# Patient Record
Sex: MALE | Race: WHITE | HISPANIC OR LATINO | Employment: UNEMPLOYED | URBAN - METROPOLITAN AREA
[De-identification: names, ages, dates, MRNs, and addresses within clinical notes are randomized per-mention and may not be internally consistent; named-entity substitution may affect disease eponyms.]

---

## 2023-09-15 ENCOUNTER — OFFICE VISIT (OUTPATIENT)
Dept: FAMILY MEDICINE CLINIC | Facility: CLINIC | Age: 58
End: 2023-09-15

## 2023-09-15 ENCOUNTER — NURSE TRIAGE (OUTPATIENT)
Age: 58
End: 2023-09-15

## 2023-09-15 VITALS
BODY MASS INDEX: 31.98 KG/M2 | RESPIRATION RATE: 16 BRPM | DIASTOLIC BLOOD PRESSURE: 80 MMHG | TEMPERATURE: 98.6 F | HEART RATE: 59 BPM | OXYGEN SATURATION: 99 % | WEIGHT: 211 LBS | SYSTOLIC BLOOD PRESSURE: 130 MMHG | HEIGHT: 68 IN

## 2023-09-15 DIAGNOSIS — C61 PROSTATE CANCER (HCC): ICD-10-CM

## 2023-09-15 DIAGNOSIS — C61 PROSTATE CANCER (HCC): Primary | ICD-10-CM

## 2023-09-15 DIAGNOSIS — I10 PRIMARY HYPERTENSION: Primary | ICD-10-CM

## 2023-09-15 DIAGNOSIS — E78.5 HYPERLIPIDEMIA, UNSPECIFIED HYPERLIPIDEMIA TYPE: ICD-10-CM

## 2023-09-15 PROCEDURE — 99204 OFFICE O/P NEW MOD 45 MIN: CPT | Performed by: FAMILY MEDICINE

## 2023-09-15 RX ORDER — AMLODIPINE BESYLATE 5 MG/1
5 TABLET ORAL DAILY
Qty: 90 TABLET | Refills: 1 | Status: SHIPPED | OUTPATIENT
Start: 2023-09-15

## 2023-09-15 RX ORDER — HYDROCHLOROTHIAZIDE 25 MG/1
TABLET ORAL
COMMUNITY
Start: 2023-08-28

## 2023-09-15 RX ORDER — ATORVASTATIN CALCIUM 10 MG/1
TABLET, FILM COATED ORAL
COMMUNITY
Start: 2023-06-23 | End: 2023-09-15 | Stop reason: SDUPTHER

## 2023-09-15 RX ORDER — ATORVASTATIN CALCIUM 10 MG/1
10 TABLET, FILM COATED ORAL DAILY
Qty: 90 TABLET | Refills: 1 | Status: SHIPPED | OUTPATIENT
Start: 2023-09-15

## 2023-09-15 RX ORDER — AMLODIPINE BESYLATE 5 MG/1
TABLET ORAL
COMMUNITY
Start: 2023-06-23 | End: 2023-09-15 | Stop reason: SDUPTHER

## 2023-09-15 NOTE — TELEPHONE ENCOUNTER
Looks OK to me. This patient is on active surveillance for mitchell 6 prostate cancer since 2019. seen in [place] and then [facility]. Last seen March. On protocol. This is second/third opinion. He is due for PSA prior to visit this fall.

## 2023-09-15 NOTE — TELEPHONE ENCOUNTER
Please assist in scheduling an earlier appointment for elevated psa of 5.32 history of prostate cancer. [place]ian provider preferred. Regarding: Prostate cancer  ----- Message from [place], [place] sent at 9/15/2023 11:22 AM EDT -----  New Patient    What is the reason for the patient's appointment?:  Errol Yin from [address] called to schedule prostate cancer. Pt was diagnosed at [facility] will need .    Pt can be reached at [phone]    What office location does the patient prefer?: WE    Does patient have Imaging/Lab Results:    Have patient records been requested?:  If No, are the records showing in Epic:       INSURANCE:   Do we accept the patient's insurance or is the patient Self-Pay?:    Insurance Provider: [employer] Type/Number:   Member ID#:       HISTORY:   Has the patient had any previous Urologist(s)?: [facility]     Was the patient seen in the ED?: no     Has the patient had any outside testing done?:[facility]     Does the patient have a personal history of cancer?: Prostate cancer

## 2023-09-15 NOTE — TELEPHONE ENCOUNTER
Spoke with patients spouse Marcin Avila and confirmed patients upcoming appointment. Aware patient will require updated PSA level prior to appointment. She requested script be e-mailed to [email or web address] as patient uses [employer]. Script sent.

## 2023-09-15 NOTE — PROGRESS NOTES
Name: Azul Nicole      : 1965      MRN: [id number]  Encounter Provider: Tracey Jensen MD  Encounter Date: 9/15/2023   Encounter department: [address] [facility]     1. Primary hypertension  Assessment & Plan:  BP today is 130/80 mmHg  Stable  Continue amlodipine 5 mg and hydrochlorothiazide 25 mg daily  Low-salt diet, regular exercise, weight loss    Orders:  -     amLODIPine (NORVASC) 5 mg tablet; Take 1 tablet (5 mg total) by mouth daily    2. Hyperlipidemia, unspecified hyperlipidemia type  Assessment & Plan:  Continue atorvastatin 10 mg daily  Labs reviewed  Low Fat Diet, regular Exercise      Orders:  -     atorvastatin (LIPITOR) 10 mg tablet; Take 1 tablet (10 mg total) by mouth daily    3. Prostate cancer [facility])  Assessment & Plan: We will refer to urology for further management    Orders:  -     Ambulatory Referral to Urology; Future           Subjective     HPI   Azul Nicole is a 59 y.o. male  who presented to the office today to establish care. He has a past medical history significant for prostate cancer, hypertension, hyperlipidemia and obesity. Today reports he is feeling well and has no concerns other than reestablishing care with a specialist for continued care especially for the prostate cancer. He has a strong family history of prostate cancer. The following portions of the patient's history were reviewed and updated as appropriate: allergies, current medications, past family history, past medical history, past social history, past surgical history and problem list.    Review of Systems   Constitutional: Negative for chills and fever. HENT: Negative for congestion, rhinorrhea and sore throat. Respiratory: Negative for cough and shortness of breath. Cardiovascular: Negative for chest pain. Gastrointestinal: Negative for diarrhea, nausea and vomiting.    Genitourinary: Negative for difficulty urinating and frequency. Skin: Negative for rash. Neurological: Negative for dizziness and headaches. Past Medical History:   Diagnosis Date   • Hyperlipidemia    • Hypertension    • Prostate cancer [facility])      History reviewed. No pertinent surgical history. Family History   Problem Relation Age of Onset   • Diabetes Mother    • Hypertension Mother    • Hypertension Father    • Prostate cancer Father    • Prostate cancer Brother    • Diabetes Paternal Grandfather    • Prostate cancer Paternal Uncle      Social History     Socioeconomic History   • Marital status: /Civil Union     Spouse name: None   • Number of children: 2   • Years of education: None   • Highest education level: 11th grade   Occupational History   • None   Tobacco Use   • Smoking status: Never     Passive exposure: Never   • Smokeless tobacco: Never   Vaping Use   • Vaping Use: Never used   Substance and Sexual Activity   • Alcohol use: Yes     Alcohol/week: 2.0 standard drinks of alcohol     Types: 2 Glasses of wine per week     Comment: socially   • Drug use: Not Currently   • Sexual activity: Yes     Partners: Female   Other Topics Concern   • None   Social History Narrative    Lives wife, mother-in-law, daughter, and granddaughter    No pets    Occupation: construction     Social Determinants of Health     Financial Resource Strain: [address]  (9/15/2023)    Overall Financial Resource Strain (CARDIA)    • Difficulty of Paying Living Expenses: Not hard at all   Food Insecurity: No Food Insecurity (9/15/2023)    Hunger Vital Sign    • Worried About Running Out of Food in the Last Year: Never true    • Ran Out of Food in the Last Year: Never true   Transportation Needs: No Transportation Needs (9/15/2023)    PRAPARE - Transportation    • Lack of Transportation (Medical): No    • Lack of Transportation (Non-Medical):  No   Physical Activity: Not on file   Stress: Not on file   Social Connections: Not on file   Intimate Partner Violence: Not on file   Housing Stability: Not on file     Current Outpatient Medications on File Prior to Visit   Medication Sig   • hydrochlorothiazide (HYDRODIURIL) 25 mg tablet      No Known Allergies  Immunization History   Administered Date(s) Administered   • COVID-19 PFIZER VACCINE 0.3 ML IM 06/10/2021, 07/01/2021   • COVID-19 Pfizer vac (King-sucrose, gray cap) 12 yr+ IM 03/31/2022       Objective     /80 (BP Location: Right arm, Patient Position: Sitting, Cuff Size: Large)   Pulse 59   Temp 98.6 °F (37 °C) (Temporal)   Resp 16   Ht 5' 8" (1.727 m)   Wt 95.7 kg (211 lb)   SpO2 99%   BMI 32.08 kg/m²     Physical Exam  Vitals and nursing note reviewed. Constitutional:       Appearance: He is well-developed. HENT:      Head: Normocephalic and atraumatic. Right Ear: External ear normal.      Left Ear: External ear normal.   Eyes:      Extraocular Movements: Extraocular movements intact. Conjunctiva/sclera: Conjunctivae normal.   Cardiovascular:      Rate and Rhythm: Normal rate and regular rhythm. Heart sounds: Normal heart sounds. No murmur heard. Pulmonary:      Effort: Pulmonary effort is normal. No respiratory distress. Abdominal:      General: There is no distension. Palpations: Abdomen is soft. Tenderness: There is no abdominal tenderness. Musculoskeletal:      Right lower leg: No edema. Left lower leg: No edema. Neurological:      General: No focal deficit present. Mental Status: He is alert and oriented to person, place, and time.    Psychiatric:         Mood and Affect: Mood normal.         Behavior: Behavior normal.       Lawyer Ga MD

## 2023-10-03 PROBLEM — E78.5 HYPERLIPIDEMIA: Status: ACTIVE | Noted: 2023-10-03

## 2023-10-03 PROBLEM — I10 PRIMARY HYPERTENSION: Status: ACTIVE | Noted: 2023-10-03

## 2023-10-03 PROBLEM — C61 PROSTATE CANCER (HCC): Status: ACTIVE | Noted: 2023-10-03

## 2023-10-03 NOTE — ASSESSMENT & PLAN NOTE
BP today is 130/80 mmHg  Stable  Continue amlodipine 5 mg and hydrochlorothiazide 25 mg daily  Low-salt diet, regular exercise, weight loss

## 2023-10-30 ENCOUNTER — TELEPHONE (OUTPATIENT)
Dept: FAMILY MEDICINE CLINIC | Facility: CLINIC | Age: 58
End: 2023-10-30

## 2023-11-10 ENCOUNTER — OFFICE VISIT (OUTPATIENT)
Dept: UROLOGY | Facility: AMBULATORY SURGERY CENTER | Age: 58
End: 2023-11-10
Payer: COMMERCIAL

## 2023-11-10 VITALS
DIASTOLIC BLOOD PRESSURE: 86 MMHG | WEIGHT: 205 LBS | OXYGEN SATURATION: 95 % | BODY MASS INDEX: 31.17 KG/M2 | HEART RATE: 68 BPM | SYSTOLIC BLOOD PRESSURE: 126 MMHG

## 2023-11-10 DIAGNOSIS — C61 PROSTATE CANCER (HCC): ICD-10-CM

## 2023-11-10 PROCEDURE — 99204 OFFICE O/P NEW MOD 45 MIN: CPT | Performed by: UROLOGY

## 2023-11-10 NOTE — PROGRESS NOTES
11/10/2023    Juan Mcdonald  1965  [id number]        Assessment  Small volume Ramesh 6 prostate cancer on active surveillance      Discussion  I had a lengthy discussion with Juan and his wife in the office today regarding [place] 6 prostate cancer. I am now the third urologist he has seen including his urologist in [place] as well as [facility].  I recommend that we repeat a PSA as well as a basic metabolic panel along with a multiparametric MRI of the prostate prior to making a decision about a possible fourth prostate biopsy. Patient will return after the above been completed. We discussed that approximately one third of men on active surveillance will eventually have disease progression and need some type of definitive treatment, however, two thirds of men will continue safely on active surveillance. History of Present Illness  59 y.o. male with a history of an elevated PSA of 5.23 from February 2023. The patient was diagnosed with prostate cancer in 2019 in [place]. In care everywhere there is report of a 12 core biopsy showing Ramesh 3+3 disease. The patient was placed on to active surveillance. Luqit genomic testing was performed on that biopsy. 10-year disease specific mortality with active surveillance was noted to be 1.6%. 10-year risk of metastatic disease with primary treatment was noted to be 0.4%. He had a multiparametric MRI of the prostate performed at [facility] in March 2020 that showed only PI-RADS 1 scoring. He denies any lower urinary tract symptoms or hematuria. He is switching from [facility] to [facility] [place] secondary to an insurance change. AUA Symptom Score      Review of Systems  Review of Systems   Constitutional: Negative. HENT: Negative. Eyes: Negative. Respiratory: Negative. Cardiovascular: Negative. Gastrointestinal: Negative. Endocrine: Negative.     Genitourinary:         Per HPI Musculoskeletal: Negative. Skin: Negative. Allergic/Immunologic: Negative. Neurological: Negative. Hematological: Negative. Psychiatric/Behavioral: Negative. Past Medical History  Past Medical History:   Diagnosis Date    Hyperlipidemia     Hypertension     Prostate cancer [facility])        Past Social History  History reviewed. No pertinent surgical history. Past Family History  Family History   Problem Relation Age of Onset    Diabetes Mother     Hypertension Mother     Hypertension Father     Prostate cancer Father     Prostate cancer Brother     Diabetes Paternal Grandfather     Prostate cancer Paternal Uncle        Past Social history  Social History     Socioeconomic History    Marital status: /Civil Union     Spouse name: Not on file    Number of children: 2    Years of education: Not on file    Highest education level: 11th grade   Occupational History    Not on file   Tobacco Use    Smoking status: Never     Passive exposure: Never    Smokeless tobacco: Never   Vaping Use    Vaping Use: Never used   Substance and Sexual Activity    Alcohol use:  Yes     Alcohol/week: 2.0 standard drinks of alcohol     Types: 2 Glasses of wine per week     Comment: socially    Drug use: Not Currently    Sexual activity: Yes     Partners: Female   Other Topics Concern    Not on file   Social History Narrative    Lives wife, mother-in-law, daughter, and granddaughter    No pets    Occupation: construction     Social Determinants of Health     Financial Resource Strain: [address]  (9/15/2023)    Overall Financial Resource Strain (CARDIA)     Difficulty of Paying Living Expenses: Not hard at all   Food Insecurity: No Food Insecurity (9/15/2023)    Hunger Vital Sign     Worried About Running Out of Food in the Last Year: Never true     [address] in the Last Year: Never true   Transportation Needs: No Transportation Needs (9/15/2023)    PRAPARE - Transportation     Lack of Transportation (Medical): No     Lack of Transportation (Non-Medical): No   Physical Activity: Not on file   Stress: Not on file   Social Connections: Not on file   Intimate Partner Violence: Not on file   Housing Stability: Not on file       Current Medications  Current Outpatient Medications   Medication Sig Dispense Refill    amLODIPine (NORVASC) 5 mg tablet Take 1 tablet (5 mg total) by mouth daily 90 tablet 1    atorvastatin (LIPITOR) 10 mg tablet Take 1 tablet (10 mg total) by mouth daily 90 tablet 1    hydrochlorothiazide (HYDRODIURIL) 25 mg tablet        No current facility-administered medications for this visit. Allergies  No Known Allergies    Past Medical History, Social History, Family History, medications and allergies were reviewed. Vitals  Vitals:    11/10/23 1258   BP: 126/86   BP Location: Left arm   Patient Position: Sitting   Cuff Size: Large   Pulse: 68   SpO2: 95%   Weight: 93 kg (205 lb)       Physical Exam  Physical Exam  On examination he is in no acute distress. His abdomen is soft nontender nondistended.  examination feels no inguinal hernias. Phallus is normal and uncircumcised. Scrotum scrotal contents are normal.  Digital rectal examination reveals a 945 to 50 g prostate without nodularity. Skin is warm. Extremities without edema.   Neurologic is grossly intact and nonfocal.  Gait normal.  Affect normal      Results  No results found for: "PSA"  No results found for: "GLUCOSE", "CALCIUM", "NA", "K", "CO2", "CL", "BUN", "CREATININE"  No results found for: "WBC", "HGB", "HCT", "MCV", "PLT"      Office Urine Dip  No results found for this or any previous visit (from the past 1 hour(s)).]

## 2023-11-27 LAB
BUN SERPL-MCNC: 19 MG/DL (ref 7–25)
BUN/CREAT SERPL: NORMAL (CALC) (ref 6–22)
CALCIUM SERPL-MCNC: 9.7 MG/DL (ref 8.6–10.3)
CHLORIDE SERPL-SCNC: 104 MMOL/L (ref 98–110)
CO2 SERPL-SCNC: 29 MMOL/L (ref 20–32)
CREAT SERPL-MCNC: 1.08 MG/DL (ref 0.7–1.35)
GFR/BSA.PRED SERPLBLD CYS-BASED-ARV: 79 ML/MIN/1.73M2
GLUCOSE SERPL-MCNC: 99 MG/DL (ref 65–99)
POTASSIUM SERPL-SCNC: 3.9 MMOL/L (ref 3.5–5.3)
PSA SERPL-MCNC: 7.18 NG/ML
SODIUM SERPL-SCNC: 142 MMOL/L (ref 135–146)

## 2023-12-04 ENCOUNTER — HOSPITAL ENCOUNTER (OUTPATIENT)
Facility: MEDICAL CENTER | Age: 58
Discharge: HOME | End: 2023-12-04
Payer: COMMERCIAL

## 2023-12-04 DIAGNOSIS — C61 PROSTATE CANCER (HCC): ICD-10-CM

## 2023-12-04 PROCEDURE — 72197 MRI PELVIS W/O & W/DYE: CPT

## 2023-12-04 PROCEDURE — A9585 GADOBUTROL INJECTION: HCPCS | Performed by: UROLOGY

## 2023-12-04 PROCEDURE — G1004 CDSM NDSC: HCPCS

## 2023-12-04 PROCEDURE — 76377 3D RENDER W/INTRP POSTPROCES: CPT

## 2023-12-04 RX ORDER — GADOBUTROL 604.72 MG/ML
9 INJECTION INTRAVENOUS
Status: COMPLETED | OUTPATIENT
Start: 2023-12-04 | End: 2023-12-04

## 2023-12-04 RX ADMIN — GADOBUTROL 9 ML: 604.72 INJECTION INTRAVENOUS at 16:14

## 2023-12-11 ENCOUNTER — TELEPHONE (OUTPATIENT)
Dept: UROLOGY | Facility: AMBULATORY SURGERY CENTER | Age: 58
End: 2023-12-11

## 2023-12-11 DIAGNOSIS — C61 PROSTATE CANCER (HCC): Primary | ICD-10-CM

## 2023-12-11 NOTE — TELEPHONE ENCOUNTER
can reschedule to a day FT can see him for ongoing active surveillance prostate cancer third opinion

## 2023-12-12 NOTE — TELEPHONE ENCOUNTER
Called and spoke with patient. Informed on appt change. He is aware of appt location. Appt reminder card mailed.

## 2024-02-20 LAB — PSA SERPL-MCNC: 6.2 NG/ML

## 2024-02-27 ENCOUNTER — OFFICE VISIT (OUTPATIENT)
Dept: UROLOGY | Facility: CLINIC | Age: 59
End: 2024-02-27
Payer: COMMERCIAL

## 2024-02-27 VITALS
HEART RATE: 60 BPM | OXYGEN SATURATION: 97 % | WEIGHT: 209 LBS | DIASTOLIC BLOOD PRESSURE: 90 MMHG | SYSTOLIC BLOOD PRESSURE: 122 MMHG | HEIGHT: 68 IN | BODY MASS INDEX: 31.67 KG/M2

## 2024-02-27 DIAGNOSIS — C61 PROSTATE CANCER (HCC): Primary | ICD-10-CM

## 2024-02-27 PROCEDURE — 99214 OFFICE O/P EST MOD 30 MIN: CPT | Performed by: UROLOGY

## 2024-02-27 NOTE — PROGRESS NOTES
2/27/2024    Juan Galvez  1965  [id number]        Assessment  Ramesh 6 prostate cancer on active surveillance      Discussion  We discussed his most recent multiparametric MRI of the prostate with PI-RADS 2 scoring as well as a stable PSA of 6.  He wishes to continue with active surveillance which I feel is very reasonable.  We did, however, discussed that his last prostate biopsy was performed in 2021 and that we should highly consider repeating a biopsy in calendar year 2024.  He wishes to wait until he follows up in 6 months time with his next PSA level.  We will then make a determination to perform a surveillance biopsy.  We discussed an office-based biopsy versus a transperineal biopsy in the operating room with sedation and he would favor the latter.  His wife was present in the office today for discussion.        History of Present Illness  58 y.o. male with a history of Ramesh 6 prostate cancer on active surveillance.  He was diagnosed originally in 2019 in [facility].  He has small volume Ramesh 6 disease.  He had a confirmatory biopsy in 2021 at [facility].  His PSA has ranged between 5 and 7 and most recently is 6.  His last biopsy in 2021 showed small volume Coello 6 disease with a very favorable Prolaris profile with less than 1% 10-year disease specific mortality with observation.  He is opted to continue with active surveillance.  He returns in routine follow-up today.  As part of active surveillance he had a multiparametric MRI of the prostate performed in December 2023 which shows PI-RADS 2 scoring.          AUA Symptom Score      Review of Systems  Review of Systems   Constitutional: Negative.    HENT: Negative.     Eyes: Negative.    Respiratory: Negative.     Cardiovascular: Negative.    Gastrointestinal: Negative.    Endocrine: Negative.    Genitourinary:         Per HPI   Musculoskeletal: Negative.    Skin: Negative.    Allergic/Immunologic: Negative.     Neurological: Negative.    Hematological: Negative.    Psychiatric/Behavioral: Negative.           Past Medical History  Past Medical History:   Diagnosis Date    Hyperlipidemia     Hypertension     Prostate cancer (HCC)        Past Social History  History reviewed. No pertinent surgical history.    Past Family History  Family History   Problem Relation Age of Onset    Diabetes Mother     Hypertension Mother     Hypertension Father     Prostate cancer Father     Prostate cancer Brother     Diabetes Paternal Grandfather     Prostate cancer Paternal Uncle        Past Social history  Social History     Socioeconomic History    Marital status: /Civil Union     Spouse name: Not on file    Number of children: 2    Years of education: Not on file    Highest education level: 11th grade   Occupational History    Not on file   Tobacco Use    Smoking status: Never     Passive exposure: Never    Smokeless tobacco: Never   Vaping Use    Vaping status: Never Used   Substance and Sexual Activity    Alcohol use: Yes     Alcohol/week: 2.0 standard drinks of alcohol     Types: 2 Glasses of wine per week     Comment: socially    Drug use: Not Currently    Sexual activity: Yes     Partners: Female   Other Topics Concern    Not on file   Social History Narrative    Lives wife, mother-in-law, daughter, and granddaughter    No pets    Occupation: construction     Social Determinants of Health     Financial Resource Strain: Low Risk  (9/15/2023)    Overall Financial Resource Strain (CARDIA)     Difficulty of Paying Living Expenses: Not hard at all   Food Insecurity: No Food Insecurity (9/15/2023)    Hunger Vital Sign     Worried About Running Out of Food in the Last Year: Never true     Ran Out of Food in the Last Year: Never true   Transportation Needs: No Transportation Needs (9/15/2023)    PRAPARE - Transportation     Lack of Transportation (Medical): No     Lack of Transportation (Non-Medical): No   Physical Activity:  Sufficiently Active (2/27/2023)    Received from [facility]    Exercise Vital Sign     Days of Exercise per Week: 4 days     Minutes of Exercise per Session: 90 min   Stress: No Stress Concern Present (2/27/2023)    Received from [facility]    [employer] - Occupational Stress Questionnaire     Feeling of Stress : Only a little   Social Connections: Socially Isolated (2/27/2023)    Received from [facility]    Social Connection and Isolation Panel [NHANES]     Frequency of Communication with Friends and Family: Once a week     Frequency of Social Gatherings with Friends and Family: Once a week     Attends Jehovah's witness Services: Never     Active Member of Clubs or Organizations: No     Attends Club or Organization Meetings: Never     Marital Status:    Intimate Partner Violence: Not At Risk (2/27/2023)    Received from [facility]    Humiliation, Afraid, Rape, and Kick questionnaire     Fear of Current or Ex-Partner: No     Emotionally Abused: No     Physically Abused: No     Sexually Abused: No   Housing Stability: Low Risk  (2/27/2023)    Received from [facility]    Housing Stability Vital Sign     Unable to Pay for Housing in the Last Year: No     Number of Places Lived in the Last Year: 1     Unstable Housing in the Last Year: No       Current Medications  Current Outpatient Medications   Medication Sig Dispense Refill    amLODIPine (NORVASC) 5 mg tablet Take 1 tablet (5 mg total) by mouth daily 90 tablet 1    atorvastatin (LIPITOR) 10 mg tablet Take 1 tablet (10 mg total) by mouth daily 90 tablet 1    hydrochlorothiazide (HYDRODIURIL) 25 mg tablet        No current facility-administered medications for this visit.       Allergies  No Known Allergies    Past Medical History, Social History, Family History, medications and allergies were reviewed.    Vitals  Vitals:    02/27/24 1129   BP: 122/90  "  BP Location: Left arm   Patient Position: Sitting   Cuff Size: Large   Pulse: 60   SpO2: 97%   Weight: 94.8 kg (209 lb)   Height: 5' 8\" (1.727 m)       Physical Exam  Physical Exam  On examination he is in no acute distress.  Gait normal.  Affect normal      Results  Lab Results   Component Value Date    PSA 6.20 (H) 02/20/2024    PSA 7.18 (H) 11/27/2023     Lab Results   Component Value Date    CALCIUM 9.7 11/27/2023    K 3.9 11/27/2023    CO2 29 11/27/2023     11/27/2023    BUN 19 11/27/2023    CREATININE 1.08 11/27/2023     No results found for: \"WBC\", \"HGB\", \"HCT\", \"MCV\", \"PLT\"      Office Urine Dip  No results found for this or any previous visit (from the past 1 hour(s)).]          "

## 2024-02-28 ENCOUNTER — OFFICE VISIT (OUTPATIENT)
Dept: FAMILY MEDICINE CLINIC | Facility: CLINIC | Age: 59
End: 2024-02-28

## 2024-02-28 VITALS
OXYGEN SATURATION: 97 % | HEIGHT: 68 IN | TEMPERATURE: 97.2 F | RESPIRATION RATE: 16 BRPM | WEIGHT: 212 LBS | HEART RATE: 75 BPM | SYSTOLIC BLOOD PRESSURE: 130 MMHG | BODY MASS INDEX: 32.13 KG/M2 | DIASTOLIC BLOOD PRESSURE: 90 MMHG

## 2024-02-28 DIAGNOSIS — E78.5 HYPERLIPIDEMIA, UNSPECIFIED HYPERLIPIDEMIA TYPE: ICD-10-CM

## 2024-02-28 DIAGNOSIS — Z23 ENCOUNTER FOR IMMUNIZATION: ICD-10-CM

## 2024-02-28 DIAGNOSIS — L30.9 DERMATITIS: ICD-10-CM

## 2024-02-28 DIAGNOSIS — Z11.59 NEED FOR HEPATITIS C SCREENING TEST: ICD-10-CM

## 2024-02-28 DIAGNOSIS — I10 PRIMARY HYPERTENSION: ICD-10-CM

## 2024-02-28 DIAGNOSIS — Z11.4 ENCOUNTER FOR SCREENING FOR HIV: ICD-10-CM

## 2024-02-28 DIAGNOSIS — Z00.00 ANNUAL PHYSICAL EXAM: Primary | ICD-10-CM

## 2024-02-28 DIAGNOSIS — J30.9 ALLERGIC RHINITIS, UNSPECIFIED SEASONALITY, UNSPECIFIED TRIGGER: ICD-10-CM

## 2024-02-28 PROCEDURE — 90471 IMMUNIZATION ADMIN: CPT

## 2024-02-28 PROCEDURE — 99396 PREV VISIT EST AGE 40-64: CPT | Performed by: FAMILY MEDICINE

## 2024-02-28 PROCEDURE — 90715 TDAP VACCINE 7 YRS/> IM: CPT

## 2024-02-28 RX ORDER — AMLODIPINE BESYLATE 5 MG/1
5 TABLET ORAL DAILY
Qty: 90 TABLET | Refills: 1 | Status: SHIPPED | OUTPATIENT
Start: 2024-02-28

## 2024-02-28 RX ORDER — FLUTICASONE PROPIONATE 50 MCG
1 SPRAY, SUSPENSION (ML) NASAL DAILY
Qty: 16 G | Refills: 2 | Status: SHIPPED | OUTPATIENT
Start: 2024-02-28

## 2024-02-28 RX ORDER — HYDROCHLOROTHIAZIDE 25 MG/1
25 TABLET ORAL DAILY
Qty: 90 TABLET | Refills: 1 | Status: SHIPPED | OUTPATIENT
Start: 2024-02-28

## 2024-02-28 RX ORDER — ATORVASTATIN CALCIUM 10 MG/1
10 TABLET, FILM COATED ORAL DAILY
Qty: 90 TABLET | Refills: 1 | Status: SHIPPED | OUTPATIENT
Start: 2024-02-28

## 2024-02-28 NOTE — PROGRESS NOTES
ADULT ANNUAL PHYSICAL  [facility]    NAME: Juan Galvez  AGE: 58 y.o. SEX: male  : 1965     DATE: 3/5/2024     Assessment and Plan:     Problem List Items Addressed This Visit        Cardiovascular and Mediastinum    Primary hypertension    Relevant Medications    hydroCHLOROthiazide 25 mg tablet    amLODIPine (NORVASC) 5 mg tablet    Other Relevant Orders    Lipid panel    CBC and Platelet    Comprehensive metabolic panel    Hemoglobin A1C       Other    Hyperlipidemia    Relevant Medications    atorvastatin (LIPITOR) 10 mg tablet    Other Relevant Orders    Lipid panel    CBC and Platelet    Comprehensive metabolic panel    Hemoglobin A1C   Other Visit Diagnoses     Annual physical exam    -  Primary    Allergic rhinitis, unspecified seasonality, unspecified trigger        Relevant Medications    fluticasone (FLONASE) 50 mcg/act nasal spray    Encounter for immunization        Relevant Orders    TDAP VACCINE GREATER THAN OR EQUAL TO 8YO IM (Completed)    Encounter for screening for HIV        Relevant Orders    HIV 1/2 AG/AB w Reflex [facility] for 2 yr old and above    Need for hepatitis C screening test        Relevant Orders    Hepatitis C antibody    BMI 32.0-32.9,adult        Dermatitis        Relevant Medications    hydrocortisone 2.5 % cream          Immunizations and preventive care screenings were discussed with patient today. Appropriate education was printed on patient's after visit summary.    Discussed risks and benefits of prostate cancer screening. We discussed the controversial history of PSA screening for prostate cancer in the [place] as well as the risk of over detection and over treatment of prostate cancer by way of PSA screening.  The patient understands that PSA blood testing is an imperfect way to screen for prostate cancer and that elevated PSA levels in the blood may also be caused by infection,  inflammation, prostatic trauma or manipulation, urological procedures, or by benign prostatic enlargement.    The role of the digital rectal examination in prostate cancer screening was also discussed and I discussed with him that there is large interobserver variability in the findings of digital rectal examination.    Counseling:  Alcohol/drug use: discussed moderation in alcohol intake, the recommendations for healthy alcohol use, and avoidance of illicit drug use.  Dental Health: discussed importance of regular tooth brushing, flossing, and dental visits.  Injury prevention: discussed safety/seat belts, safety helmets, smoke detectors, carbon dioxide detectors, and smoking near bedding or upholstery.  Exercise: the importance of regular exercise/physical activity was discussed. Recommend exercise 3-5 times per week for at least 30 minutes.          Return in about 6 months (around 8/28/2024) for Recheck Hypertension, HLD.     Chief Complaint:     Chief Complaint   Patient presents with   • Physical Exam      History of Present Illness:     Adult Annual Physical   Patient here for a comprehensive physical exam. The patient reports problems - left akle pain when the weather is cold. .    Diet and Physical Activity  Diet/Nutrition: well balanced diet, heart healthy (low sodium) diet, and consuming 3-5 servings of fruits/vegetables daily.   Exercise: no formal exercise. States he is active at work     Depression Screening  PHQ-2/9 Depression Screening         General Health  Sleep: sleeps well and gets 4-6 hours of sleep on average.   Hearing: normal - bilateral.  Vision: most recent eye exam <1 year ago and wears glasses.   Dental: regular dental visits and brushes teeth twice daily.   He needs a root canal     Health  Symptoms include: nocturia    Advanced Care Planning  Do you have an advanced directive? no  Do you have a durable medical power of ? no  ACP document given to patient? no     Review of  Systems:     Review of Systems   Constitutional:  Negative for chills and fever.   HENT:  Negative for congestion, rhinorrhea and sore throat.    Respiratory:  Negative for cough and shortness of breath.    Cardiovascular:  Negative for chest pain.   Gastrointestinal:  Negative for diarrhea, nausea and vomiting.   Genitourinary:  Negative for difficulty urinating and frequency.   Musculoskeletal:         + chronic left shoulder pain   Skin:  Negative for rash.   Neurological:  Negative for dizziness and headaches.      Past Medical History:     Past Medical History:   Diagnosis Date   • Hyperlipidemia    • Hypertension    • Prostate cancer (HCC)       Past Surgical History:     No past surgical history on file.   Family History:     Family History   Problem Relation Age of Onset   • Diabetes Mother    • Hypertension Mother    • Hypertension Father    • Prostate cancer Father    • Prostate cancer Brother    • Diabetes Paternal Grandfather    • Prostate cancer Paternal Uncle       Social History:     Social History     Socioeconomic History   • Marital status: /Civil Union     Spouse name: None   • Number of children: 2   • Years of education: None   • Highest education level: 11th grade   Occupational History   • None   Tobacco Use   • Smoking status: Never     Passive exposure: Never   • Smokeless tobacco: Never   Vaping Use   • Vaping status: Never Used   Substance and Sexual Activity   • Alcohol use: Yes     Alcohol/week: 2.0 standard drinks of alcohol     Types: 2 Glasses of wine per week     Comment: socially   • Drug use: Not Currently   • Sexual activity: Yes     Partners: Female   Other Topics Concern   • None   Social History Narrative    Lives wife, mother-in-law, daughter, and granddaughter    No pets    Occupation: construction     Social Determinants of Health     Financial Resource Strain: Low Risk  (9/15/2023)    Overall Financial Resource Strain (CARDIA)    • Difficulty of Paying Living  Expenses: Not hard at all   Food Insecurity: No Food Insecurity (9/15/2023)    Hunger Vital Sign    • Worried About Running Out of Food in the Last Year: Never true    • Ran Out of Food in the Last Year: Never true   Transportation Needs: No Transportation Needs (9/15/2023)    PRAPARE - Transportation    • Lack of Transportation (Medical): No    • Lack of Transportation (Non-Medical): No   Physical Activity: Sufficiently Active (2/27/2023)    Received from [facility]    Exercise Vital Sign    • Days of Exercise per Week: 4 days    • Minutes of Exercise per Session: 90 min   Stress: No Stress Concern Present (2/27/2023)    Received from [facility]    [place] [employer] - Occupational Stress Questionnaire    • Feeling of Stress : Only a little   Social Connections: Socially Isolated (2/27/2023)    Received from [facility]    Social Connection and Isolation Panel [NHANES]    • Frequency of Communication with Friends and Family: Once a week    • Frequency of Social Gatherings with Friends and Family: Once a week    • Attends [employer] Services: Never    • Active Member of Clubs or Organizations: No    • Attends Club or Organization Meetings: Never    • Marital Status:    Intimate Partner Violence: Not At Risk (2/27/2023)    Received from [facility]    Humiliation, Afraid, Rape, and Kick questionnaire    • Fear of Current or Ex-Partner: No    • Emotionally Abused: No    • Physically Abused: No    • Sexually Abused: No   Housing Stability: Low Risk  (2/27/2023)    Received from [facility]    Housing Stability Vital Sign    • Unable to Pay for Housing in the Last Year: No    • Number of Places Lived in the Last Year: 1    • Unstable Housing in the Last Year: No      Current Medications:     Current Outpatient Medications   Medication Sig Dispense Refill   • amLODIPine (NORVASC) 5 mg tablet Take 1 tablet (5  "mg total) by mouth daily 90 tablet 1   • atorvastatin (LIPITOR) 10 mg tablet Take 1 tablet (10 mg total) by mouth daily 90 tablet 1   • fluticasone (FLONASE) 50 mcg/act nasal spray 1 spray into each nostril daily 16 g 2   • hydroCHLOROthiazide 25 mg tablet Take 1 tablet (25 mg total) by mouth daily 90 tablet 1   • hydrocortisone 2.5 % cream Apply topically 2 (two) times a day 28 g 0     No current facility-administered medications for this visit.      Allergies:     No Known Allergies   Physical Exam:     /90 (BP Location: Left arm, Patient Position: Sitting, Cuff Size: Large)   Pulse 75   Temp (!) 97.2 °F (36.2 °C) (Temporal)   Resp 16   Ht 5' 8\" (1.727 m)   Wt 96.2 kg (212 lb)   SpO2 97%   BMI 32.23 kg/m²     Physical Exam  Vitals and nursing note reviewed.   Constitutional:       General: He is not in acute distress.     Appearance: Normal appearance. He is well-developed. He is obese.   HENT:      Head: Normocephalic and atraumatic.      Right Ear: External ear normal.      Left Ear: External ear normal.      Nose: Nose normal.      Mouth/Throat:      Mouth: Mucous membranes are moist.      Pharynx: No posterior oropharyngeal erythema.   Eyes:      Extraocular Movements: Extraocular movements intact.      Conjunctiva/sclera: Conjunctivae normal.      Pupils: Pupils are equal, round, and reactive to light.   Cardiovascular:      Rate and Rhythm: Normal rate and regular rhythm.      Heart sounds: No murmur heard.  Pulmonary:      Effort: Pulmonary effort is normal. No respiratory distress.      Breath sounds: Normal breath sounds.   Abdominal:      General: Bowel sounds are normal. There is no distension.      Palpations: Abdomen is soft.      Tenderness: There is no abdominal tenderness.   Musculoskeletal:         General: No swelling.      Cervical back: Neck supple.      Right lower leg: No edema.      Left lower leg: No edema.   Skin:     General: Skin is warm and dry.      Capillary Refill: " Capillary refill takes less than 2 seconds.   Neurological:      General: No focal deficit present.      Mental Status: He is alert and oriented to person, place, and time.   Psychiatric:         Mood and Affect: Mood normal.         Behavior: Behavior normal.          Mansi Sanchez MD  [facility]

## 2024-03-05 LAB
ALBUMIN SERPL-MCNC: 4.2 G/DL (ref 3.6–5.1)
ALBUMIN/GLOB SERPL: 1.7 (CALC) (ref 1–2.5)
ALP SERPL-CCNC: 58 U/L (ref 35–144)
ALT SERPL-CCNC: 31 U/L (ref 9–46)
AST SERPL-CCNC: 21 U/L (ref 10–35)
BASOPHILS # BLD AUTO: 22 CELLS/UL (ref 0–200)
BASOPHILS NFR BLD AUTO: 0.5 %
BILIRUB SERPL-MCNC: 0.4 MG/DL (ref 0.2–1.2)
BUN SERPL-MCNC: 20 MG/DL (ref 7–25)
BUN/CREAT SERPL: ABNORMAL (CALC) (ref 6–22)
CALCIUM SERPL-MCNC: 9.2 MG/DL (ref 8.6–10.3)
CHLORIDE SERPL-SCNC: 108 MMOL/L (ref 98–110)
CHOLEST SERPL-MCNC: 124 MG/DL
CHOLEST/HDLC SERPL: 4 (CALC)
CO2 SERPL-SCNC: 28 MMOL/L (ref 20–32)
CREAT SERPL-MCNC: 0.93 MG/DL (ref 0.7–1.35)
EOSINOPHIL # BLD AUTO: 228 CELLS/UL (ref 15–500)
EOSINOPHIL NFR BLD AUTO: 5.3 %
ERYTHROCYTE [DISTWIDTH] IN BLOOD BY AUTOMATED COUNT: 12.8 % (ref 11–15)
GFR/BSA.PRED SERPLBLD CYS-BASED-ARV: 94 ML/MIN/1.73M2
GLOBULIN SER CALC-MCNC: 2.5 G/DL (CALC) (ref 1.9–3.7)
GLUCOSE SERPL-MCNC: 110 MG/DL (ref 65–99)
HBA1C MFR BLD: 5.3 % OF TOTAL HGB
HCT VFR BLD AUTO: 42.3 % (ref 38.5–50)
HCV AB SERPL QL IA: NORMAL
HDLC SERPL-MCNC: 31 MG/DL
HGB BLD-MCNC: 14.7 G/DL (ref 13.2–17.1)
HIV 1+2 AB+HIV1 P24 AG SERPL QL IA: NORMAL
LDLC SERPL CALC-MCNC: 70 MG/DL (CALC)
LYMPHOCYTES # BLD AUTO: 1643 CELLS/UL (ref 850–3900)
LYMPHOCYTES NFR BLD AUTO: 38.2 %
MCH RBC QN AUTO: 30.8 PG (ref 27–33)
MCHC RBC AUTO-ENTMCNC: 34.8 G/DL (ref 32–36)
MCV RBC AUTO: 88.5 FL (ref 80–100)
MONOCYTES # BLD AUTO: 486 CELLS/UL (ref 200–950)
MONOCYTES NFR BLD AUTO: 11.3 %
NEUTROPHILS # BLD AUTO: 1922 CELLS/UL (ref 1500–7800)
NEUTROPHILS NFR BLD AUTO: 44.7 %
NONHDLC SERPL-MCNC: 93 MG/DL (CALC)
PLATELET # BLD AUTO: 206 THOUSAND/UL (ref 140–400)
PMV BLD REES-ECKER: 11.1 FL (ref 7.5–12.5)
POTASSIUM SERPL-SCNC: 3.9 MMOL/L (ref 3.5–5.3)
PROT SERPL-MCNC: 6.7 G/DL (ref 6.1–8.1)
RBC # BLD AUTO: 4.78 MILLION/UL (ref 4.2–5.8)
SODIUM SERPL-SCNC: 142 MMOL/L (ref 135–146)
TRIGL SERPL-MCNC: 156 MG/DL
WBC # BLD AUTO: 4.3 THOUSAND/UL (ref 3.8–10.8)

## 2024-09-03 ENCOUNTER — TELEPHONE (OUTPATIENT)
Dept: UROLOGY | Facility: AMBULATORY SURGERY CENTER | Age: 59
End: 2024-09-03

## 2024-09-03 NOTE — TELEPHONE ENCOUNTER
Tried calling patient to see if he can get his blood work PSA completed prior to is appt with us on Friday. Left pt a detailed message stating everything. PSA order is already in the system.   [place]erm[place] Border Text: The closure involved the vermilion border.

## 2024-09-05 LAB — PSA SERPL-MCNC: 7.09 NG/ML

## 2024-09-06 ENCOUNTER — OFFICE VISIT (OUTPATIENT)
Dept: UROLOGY | Facility: AMBULATORY SURGERY CENTER | Age: 59
End: 2024-09-06
Payer: COMMERCIAL

## 2024-09-06 VITALS
HEIGHT: 68 IN | DIASTOLIC BLOOD PRESSURE: 82 MMHG | SYSTOLIC BLOOD PRESSURE: 130 MMHG | BODY MASS INDEX: 32.13 KG/M2 | WEIGHT: 212 LBS | OXYGEN SATURATION: 97 % | HEART RATE: 62 BPM

## 2024-09-06 DIAGNOSIS — C61 PROSTATE CANCER (HCC): Primary | ICD-10-CM

## 2024-09-06 PROCEDURE — 99214 OFFICE O/P EST MOD 30 MIN: CPT | Performed by: UROLOGY

## 2024-09-06 NOTE — ASSESSMENT & PLAN NOTE
Impression: History of small-volume grade group 1 Ramesh 6 prostate cancer with stable PSA and digital rectal examination    Plan: Today we discussed options including a surveillance biopsy versus continued observation.  The patient is very comfortable continuing with active surveillance/observation.  I recommend follow-up in 6 months with a PSA, BMP, and multiparametric MRI of the prostate.  If there is any increase in PI-RADS scoring we will then readdress a surveillance biopsy.  The patient and his wife are amenable with this plan and understand the risk of disease progression on active surveillance.

## 2024-09-06 NOTE — PROGRESS NOTES
9/6/2024    Juan Galvez  1965  [id number]    1. Prostate cancer (HCC)  Assessment & Plan:  Impression: History of small-volume grade group 1 Ramesh 6 prostate cancer with stable PSA and digital rectal examination    Plan: Today we discussed options including a surveillance biopsy versus continued observation.  The patient is very comfortable continuing with active surveillance/observation.  I recommend follow-up in 6 months with a PSA, BMP, and multiparametric MRI of the prostate.  If there is any increase in PI-RADS scoring we will then readdress a surveillance biopsy.  The patient and his wife are amenable with this plan and understand the risk of disease progression on active surveillance.  Orders:  -     PSA Total, Diagnostic; Future; Expected date: 03/06/2025  -     Basic metabolic panel; Future; Expected date: 03/06/2025  -     MRI prostate multiparametric wo w contrast; Future; Expected date: 03/06/2025       History of Present Illness  59 y.o. male with a history of [place] 6 prostate cancer on active surveillance.  He was initially diagnosed in 2019 in [facility].  He had a confirmatory biopsy performed at [facility] in 2021.  His PSA has ranged between 5 and 7.  The biopsy in 2021 again showed small volume [place] 6 disease.  I was able to review at that time Prolaris testing which showed less than 1% 10-year disease specific mortality with observation.  I last saw him in February 2024.  His last multiparametric MRI of the prostate was in December 2023.  This revealed PI-RADS 2 scoring with a prostate size of 68 g.  His most recent PSA level is 7.09 in line with prior values.  His wife is present with him in the office today.  He denies any significant lower urinary tract symptoms or hematuria.  He is still working in construction.      AUA Symptom Score      Review of Systems    Past Medical History  Past Medical History:   Diagnosis Date   • Hyperlipidemia    • Hypertension     • Prostate cancer (HCC)        Past Social History  History reviewed. No pertinent surgical history.    Past Family History  Family History   Problem Relation Age of Onset   • Diabetes Mother    • Hypertension Mother    • Hypertension Father    • Prostate cancer Father    • Prostate cancer Brother    • Diabetes Paternal Grandfather    • Prostate cancer Paternal Uncle        Past Social history  Social History     Socioeconomic History   • Marital status: /Civil Union     Spouse name: Not on file   • Number of children: 2   • Years of education: Not on file   • Highest education level: 11th grade   Occupational History   • Not on file   Tobacco Use   • Smoking status: Never     Passive exposure: Never   • Smokeless tobacco: Never   Vaping Use   • Vaping status: Never Used   Substance and Sexual Activity   • Alcohol use: Yes     Alcohol/week: 2.0 standard drinks of alcohol     Types: 2 Glasses of wine per week     Comment: socially   • Drug use: Not Currently   • Sexual activity: Yes     Partners: Female   Other Topics Concern   • Not on file   Social History Narrative    Lives wife, mother-in-law, daughter, and granddaughter    No pets    Occupation: construction     Social Determinants of Health     Financial Resource Strain: Low Risk  (9/15/2023)    Overall Financial Resource Strain (CARDIA)    • Difficulty of Paying Living Expenses: Not hard at all   Food Insecurity: No Food Insecurity (9/15/2023)    Hunger Vital Sign    • Worried About Running Out of Food in the Last Year: Never true    • Ran Out of Food in the Last Year: Never true   Transportation Needs: No Transportation Needs (9/15/2023)    PRAPARE - Transportation    • Lack of Transportation (Medical): No    • Lack of Transportation (Non-Medical): No   Physical Activity: Sufficiently Active (2/27/2023)    Received from [facility]    Exercise Vital Sign    • Days of Exercise per Week: 4 days    • Minutes of Exercise per Session: 90  min   Stress: No Stress Concern Present (2/27/2023)    [facility]    [place] [employer] - Occupational Stress Questionnaire    • Feeling of Stress : Only a little   Social Connections: Socially Isolated (2/27/2023)    [facility]    Social Connection and Isolation Panel [NHANES]    • Frequency of Communication with Friends and Family: Once a week    • Frequency of Social Gatherings with Friends and Family: Once a week    • Attends [employer] Services: Never    • Active Member of Clubs or Organizations: No    • Attends Club or Organization Meetings: Never    • Marital Status:    Intimate Partner Violence: Not At Risk (2/27/2023)    [facility]    Humiliation, Afraid, Rape, and Kick questionnaire    • Fear of Current or Ex-Partner: No    • Emotionally Abused: No    • Physically Abused: No    • Sexually Abused: No   Housing Stability: Low Risk  (2/27/2023)    [facility]    Housing Stability Vital Sign    • Unable to Pay for Housing in the Last Year: No    • Number of Places Lived in the Last Year: 1    • Unstable Housing in the Last Year: No       Current Medications  Current Outpatient Medications   Medication Sig Dispense Refill   • amLODIPine (NORVASC) 5 mg tablet Take 1 tablet (5 mg total) by mouth daily 90 tablet 1   • atorvastatin (LIPITOR) 10 mg tablet Take 1 tablet (10 mg total) by mouth daily 90 tablet 1   • fluticasone (FLONASE) 50 mcg/act nasal spray 1 spray into each nostril daily 16 g 2   • hydroCHLOROthiazide 25 mg tablet Take 1 tablet (25 mg total) by mouth daily 90 tablet 1   • hydrocortisone 2.5 % cream Apply topically 2 (two) times a day 28 g 0     No current facility-administered medications for this visit.       Allergies  No  "Known Allergies    Past Medical History, Social History, Family History, medications and allergies were reviewed.    Vitals  Vitals:    09/06/24 0743   BP: 130/82   BP Location: Left arm   Patient Position: Sitting   Cuff Size: Standard   Pulse: 62   SpO2: 97%   Weight: 96.2 kg (212 lb)   Height: 5' 8\" (1.727 m)       Physical Exam  On examination he is in no acute distress.  His abdomen is soft nontender nondistended.   examination reveals no inguinal hernias.  Phallus is normal and uncircumcised.  Scrotum and scrotal contents are normal.  Digital rectal examination reveals a 50 g prostate which is benign without nodularity or lesion.  Skin is warm.  Extremities without edema.  Neurologic is grossly intact and nonfocal.  Gait normal.  Affect normal  Results  Lab Results   Component Value Date    PSA 7.09 (H) 09/04/2024    PSA 6.20 (H) 02/20/2024    PSA 7.18 (H) 11/27/2023     Lab Results   Component Value Date    CALCIUM 9.2 03/05/2024    K 3.9 03/05/2024    CO2 28 03/05/2024     03/05/2024    BUN 20 03/05/2024    CREATININE 0.93 03/05/2024     Lab Results   Component Value Date    WBC 4.3 03/05/2024    HGB 14.7 03/05/2024    HCT 42.3 03/05/2024    MCV 88.5 03/05/2024     03/05/2024       Office Urine Dip  No results found for this or any previous visit (from the past 1 hour(s)).]  "

## 2024-11-12 ENCOUNTER — OFFICE VISIT (OUTPATIENT)
Dept: FAMILY MEDICINE CLINIC | Facility: CLINIC | Age: 59
End: 2024-11-12

## 2024-11-12 VITALS
WEIGHT: 219.4 LBS | RESPIRATION RATE: 16 BRPM | BODY MASS INDEX: 33.25 KG/M2 | DIASTOLIC BLOOD PRESSURE: 80 MMHG | TEMPERATURE: 97.6 F | HEART RATE: 74 BPM | HEIGHT: 68 IN | OXYGEN SATURATION: 94 % | SYSTOLIC BLOOD PRESSURE: 130 MMHG

## 2024-11-12 DIAGNOSIS — M25.512 CHRONIC PAIN IN LEFT SHOULDER: ICD-10-CM

## 2024-11-12 DIAGNOSIS — E78.5 HYPERLIPIDEMIA, UNSPECIFIED HYPERLIPIDEMIA TYPE: ICD-10-CM

## 2024-11-12 DIAGNOSIS — I10 PRIMARY HYPERTENSION: Primary | ICD-10-CM

## 2024-11-12 DIAGNOSIS — G89.29 CHRONIC PAIN IN LEFT SHOULDER: ICD-10-CM

## 2024-11-12 DIAGNOSIS — E78.2 MIXED HYPERLIPIDEMIA: ICD-10-CM

## 2024-11-12 PROCEDURE — 99214 OFFICE O/P EST MOD 30 MIN: CPT | Performed by: FAMILY MEDICINE

## 2024-11-12 RX ORDER — AMLODIPINE BESYLATE 5 MG/1
5 TABLET ORAL DAILY
Qty: 90 TABLET | Refills: 2 | Status: SHIPPED | OUTPATIENT
Start: 2024-11-12

## 2024-11-12 RX ORDER — HYDROCHLOROTHIAZIDE 25 MG/1
25 TABLET ORAL DAILY
Qty: 90 TABLET | Refills: 2 | Status: SHIPPED | OUTPATIENT
Start: 2024-11-12

## 2024-11-12 RX ORDER — ATORVASTATIN CALCIUM 10 MG/1
10 TABLET, FILM COATED ORAL DAILY
Qty: 90 TABLET | Refills: 2 | Status: SHIPPED | OUTPATIENT
Start: 2024-11-12

## 2024-11-12 NOTE — ASSESSMENT & PLAN NOTE
Lab Results   Component Value Date/Time    CHOLESTEROL 124 03/05/2024 08:42 AM    TRIG 156 (H) 03/05/2024 08:42 AM    HDL 31 (L) 03/05/2024 08:42 AM    LDLCALC 70 03/05/2024 08:42 AM     Stable   continue atorvastatin 10 mg daily  Low Fat Diet, regular Exercise

## 2024-11-12 NOTE — ASSESSMENT & PLAN NOTE
BP Readings from Last 3 Encounters:   11/12/24 130/80   09/06/24 130/82   02/28/24 130/90     Stable  Continue amlodipine 5 mg and hydrochlorothiazide 25 mg daily  Low-salt diet, regular exercise, weight loss    Orders:    amLODIPine (NORVASC) 5 mg tablet; Take 1 tablet (5 mg total) by mouth daily    hydroCHLOROthiazide 25 mg tablet; Take 1 tablet (25 mg total) by mouth daily

## 2024-11-12 NOTE — PROGRESS NOTES
Ambulatory Visit  Name: Juan Galvez      : 1965      MRN: [id number]  Encounter Provider: Mansi Sanchez MD  Encounter Date: 2024   Encounter department: [facility]    Assessment & Plan  Primary hypertension  BP Readings from Last 3 Encounters:   24 130/80   24 130/82   24 130/90     Stable  Continue amlodipine 5 mg and hydrochlorothiazide 25 mg daily  Low-salt diet, regular exercise, weight loss    Orders:    amLODIPine (NORVASC) 5 mg tablet; Take 1 tablet (5 mg total) by mouth daily    hydroCHLOROthiazide 25 mg tablet; Take 1 tablet (25 mg total) by mouth daily    Mixed hyperlipidemia  Lab Results   Component Value Date/Time    CHOLESTEROL 124 2024 08:42 AM    TRIG 156 (H) 2024 08:42 AM    HDL 31 (L) 2024 08:42 AM    LDLCALC 70 2024 08:42 AM     Stable   continue atorvastatin 10 mg daily  Low Fat Diet, regular Exercise           Chronic pain in left shoulder  Ice, heat, massage and stretches of left shoulder  History of ICS injection in the past with improvement of symptoms  Orders:    Ambulatory Referral to Orthopedic Surgery; Future    BMI Counseling: Body mass index is 33.36 kg/m². The BMI is above normal. Nutrition recommendations include decreasing portion sizes, encouraging healthy choices of fruits and vegetables, decreasing fast food intake, consuming healthier snacks and limiting drinks that contain sugar. Exercise recommendations include moderate physical activity 150 minutes/week. Rationale for BMI follow-up plan is due to patient being overweight or obese.     Depression Screening and Follow-up Plan: Patient was screened for depression during today's encounter. They screened negative with a PHQ-2 score of 0.      H/o Colonoscopy in [place] less than 10 years ago.    History of Present Illness       Juan Galvez is a 59 y.o. male  has a past medical history of Hyperlipidemia, Hypertension, and  "Prostate cancer (HCC). who presented to the office today to follow-up for his chronic conditions.  Patient states he is taking his medication for hypertension daily but does not take the atorvastatin.  He continues to have chronic left shoulder pain, has a history of steroid injection in the past with improvement of symptoms.        The following portions of the patient's history were reviewed and updated as appropriate: allergies, current medications, past family history, past medical history, past social history, past surgical history and problem list.    History obtained from : patient  Review of Systems   Constitutional:  Negative for chills and fever.   HENT:  Negative for congestion, rhinorrhea and sore throat.    Respiratory:  Negative for cough and shortness of breath.    Cardiovascular:  Negative for chest pain.   Gastrointestinal:  Negative for diarrhea, nausea and vomiting.   Genitourinary:  Negative for difficulty urinating and frequency.   Musculoskeletal:         + chronic left shoulder pain   Skin:  Negative for rash.   Neurological:  Negative for dizziness and headaches.           Objective     /80 (BP Location: Left arm, Patient Position: Sitting, Cuff Size: Standard)   Pulse 74   Temp 97.6 °F (36.4 °C) (Temporal)   Resp 16   Ht 5' 8\" (1.727 m)   Wt 99.5 kg (219 lb 6.4 oz)   SpO2 94%   BMI 33.36 kg/m²     Physical Exam  Vitals and nursing note reviewed.   Constitutional:       General: He is not in acute distress.     Appearance: Normal appearance. He is well-developed. He is obese.   HENT:      Head: Normocephalic and atraumatic.      Right Ear: External ear normal.      Left Ear: External ear normal.      Nose: Nose normal.      Mouth/Throat:      Mouth: Mucous membranes are moist.      Pharynx: No posterior oropharyngeal erythema.   Eyes:      Conjunctiva/sclera: Conjunctivae normal.   Cardiovascular:      Rate and Rhythm: Normal rate.      Heart sounds: No murmur heard.  Pulmonary: "      Effort: Pulmonary effort is normal. No respiratory distress.   Abdominal:      General: There is no distension.      Palpations: Abdomen is soft.      Tenderness: There is no abdominal tenderness.   Musculoskeletal:         General: No swelling.      Cervical back: Neck supple.      Right lower leg: No edema.      Left lower leg: No edema.   Skin:     General: Skin is warm and dry.      Capillary Refill: Capillary refill takes less than 2 seconds.   Neurological:      General: No focal deficit present.      Mental Status: He is alert and oriented to person, place, and time.   Psychiatric:         Mood and Affect: Mood normal.         Behavior: Behavior normal.

## 2024-11-13 ENCOUNTER — TELEPHONE (OUTPATIENT)
Dept: ADMINISTRATIVE | Facility: OTHER | Age: 59
End: 2024-11-13

## 2024-11-13 NOTE — TELEPHONE ENCOUNTER
----- Message from Mansi Sanchez MD sent at 11/12/2024  3:59 PM EST -----  Regarding: Care Gap Request  11/12/24 3:59 PM    Hello, our patient Juan Galvez has had CRC: Colonoscopy completed/performed. Please assist in updating the patient chart by making an External outreach to [facility] unknown. The date of service is within the last 10 years- pt reports.    Thank you,  Mansi Sanchez MD  [facility]

## 2024-11-14 NOTE — TELEPHONE ENCOUNTER
Upon review of the In Basket request we  need more information to outreach for the procedure.    Any additional questions or concerns should be emailed to the Practice Liaisons via the appropriate education email address, please do not reply via In Basket.    Thank you  Laurence Unger   [facility]

## 2025-03-12 ENCOUNTER — HOSPITAL ENCOUNTER (OUTPATIENT)
Dept: RADIOLOGY | Age: 60
Discharge: HOME | End: 2025-03-12
Payer: COMMERCIAL

## 2025-03-12 DIAGNOSIS — C61 PROSTATE CANCER (HCC): ICD-10-CM

## 2025-03-12 PROCEDURE — A9585 GADOBUTROL INJECTION: HCPCS | Performed by: UROLOGY

## 2025-03-12 PROCEDURE — 72197 MRI PELVIS W/O & W/DYE: CPT

## 2025-03-12 PROCEDURE — 76377 3D RENDER W/INTRP POSTPROCES: CPT

## 2025-03-12 RX ORDER — GADOBUTROL 604.72 MG/ML
10 INJECTION INTRAVENOUS
Status: COMPLETED | OUTPATIENT
Start: 2025-03-12 | End: 2025-03-12

## 2025-03-12 RX ADMIN — GADOBUTROL 10 ML: 604.72 INJECTION INTRAVENOUS at 09:32

## 2025-03-25 ENCOUNTER — OFFICE VISIT (OUTPATIENT)
Dept: FAMILY MEDICINE CLINIC | Facility: CLINIC | Age: 60
End: 2025-03-25

## 2025-03-25 VITALS
HEIGHT: 68 IN | RESPIRATION RATE: 19 BRPM | OXYGEN SATURATION: 96 % | HEART RATE: 74 BPM | BODY MASS INDEX: 33.22 KG/M2 | DIASTOLIC BLOOD PRESSURE: 92 MMHG | WEIGHT: 219.2 LBS | TEMPERATURE: 97.6 F | SYSTOLIC BLOOD PRESSURE: 140 MMHG

## 2025-03-25 DIAGNOSIS — I10 PRIMARY HYPERTENSION: Primary | ICD-10-CM

## 2025-03-25 DIAGNOSIS — R07.89 LEFT-SIDED CHEST WALL PAIN: ICD-10-CM

## 2025-03-25 DIAGNOSIS — E78.2 MIXED HYPERLIPIDEMIA: ICD-10-CM

## 2025-03-25 PROCEDURE — 99214 OFFICE O/P EST MOD 30 MIN: CPT | Performed by: FAMILY MEDICINE

## 2025-03-25 RX ORDER — LISINOPRIL AND HYDROCHLOROTHIAZIDE 12.5; 2 MG/1; MG/1
1 TABLET ORAL DAILY
Qty: 90 TABLET | Refills: 3 | Status: SHIPPED | OUTPATIENT
Start: 2025-03-25

## 2025-03-25 NOTE — ASSESSMENT & PLAN NOTE
BP Readings from Last 3 Encounters:   04/04/25 130/90   03/25/25 140/92   11/12/24 130/80     Stable  Discontinue Hydrochlorothiazide 25 mg   Start lisinopril-HCTZ 20 mg-12.5 mg daily  Continue Amlodipine 5 mg  Low-salt diet, regular exercise, weight loss    Orders:  •  lisinopril-hydrochlorothiazide (PRINZIDE,ZESTORETIC) 20-12.5 MG per tablet; Take 1 tablet by mouth daily  •  Echo stress test, exercise; Future

## 2025-03-25 NOTE — ASSESSMENT & PLAN NOTE
Lab Results   Component Value Date/Time    CHOLESTEROL 124 03/05/2024 08:42 AM    TRIG 156 (H) 03/05/2024 08:42 AM    HDL 31 (L) 03/05/2024 08:42 AM    LDLCALC 70 03/05/2024 08:42 AM       Continue Atorvastatin 10 mg   Low Fat Diet, regular Exercise    Orders:  •  Lipid panel; Future

## 2025-03-25 NOTE — PROGRESS NOTES
Name: Juan Galvez      : 1965      MRN: [id number]  Encounter Provider: Mansi Sanchez MD  Encounter Date: 3/25/2025   Encounter department: [facility]  :  Assessment & Plan  Primary hypertension  BP Readings from Last 3 Encounters:   25 130/90   25 140/92   24 130/80     Stable  Discontinue Hydrochlorothiazide 25 mg   Start lisinopril-HCTZ 20 mg-12.5 mg daily  Continue Amlodipine 5 mg  Low-salt diet, regular exercise, weight loss    Orders:  •  lisinopril-hydrochlorothiazide (PRINZIDE,ZESTORETIC) 20-12.5 MG per tablet; Take 1 tablet by mouth daily  •  Echo stress test, exercise; Future    Mixed hyperlipidemia  Lab Results   Component Value Date/Time    CHOLESTEROL 124 2024 08:42 AM    TRIG 156 (H) 2024 08:42 AM    HDL 31 (L) 2024 08:42 AM    LDLCALC 70 2024 08:42 AM       Continue Atorvastatin 10 mg   Low Fat Diet, regular Exercise    Orders:  •  Lipid panel; Future    Left-sided chest wall pain  Most likely due to chronic left shoulder pain, but one episode of increased chest pain 2 weeks ago  Will evaluate for CAD  Male, h/o Htn, Obesity  Orders:  •  Echo stress test, exercise; Future           History of Present Illness   HPI  Juan Galvez is a 59 y.o. male  has a past medical history of Hyperlipidemia, Hypertension, and Prostate cancer (HCC). who presented to the office today to follow up for Htn and HLD.  He states that his blood pressure has been elevated, and he is concerned.  He had an episode of left sided chest pain 2 weeks ago, and is unsure if it was due to his shoulder.  He did not have a recent injury or fall.  The pain was not associated with sob or palpitations.        The following portions of the patient's history were reviewed and updated as appropriate: allergies, current medications, past family history, past medical history, past social history, past surgical history and problem  "list.    Review of Systems   Constitutional:  Negative for chills and fever.   HENT:  Negative for congestion, rhinorrhea and sore throat.    Respiratory:  Negative for cough and shortness of breath.    Cardiovascular:  Positive for chest pain.   Gastrointestinal:  Negative for diarrhea, nausea and vomiting.   Skin:  Negative for rash.   Neurological:  Negative for dizziness and headaches.       Objective   /92 (BP Location: Left arm, Patient Position: Sitting, Cuff Size: Standard)   Pulse 74   Temp 97.6 °F (36.4 °C) (Temporal)   Resp 19   Ht 5' 8\" (1.727 m)   Wt 99.4 kg (219 lb 3.2 oz)   SpO2 96%   BMI 33.33 kg/m²      Physical Exam  Vitals and nursing note reviewed.   Constitutional:       General: He is not in acute distress.     Appearance: Normal appearance. He is well-developed. He is obese.   HENT:      Head: Normocephalic and atraumatic.      Right Ear: External ear normal.      Left Ear: External ear normal.      Nose: Nose normal.      Mouth/Throat:      Mouth: Mucous membranes are moist.      Pharynx: No posterior oropharyngeal erythema.   Eyes:      Conjunctiva/sclera: Conjunctivae normal.   Cardiovascular:      Rate and Rhythm: Normal rate.      Heart sounds: No murmur heard.  Pulmonary:      Effort: Pulmonary effort is normal. No respiratory distress.   Abdominal:      General: There is no distension.      Palpations: Abdomen is soft.      Tenderness: There is no abdominal tenderness.   Musculoskeletal:         General: No swelling.      Cervical back: Neck supple.      Right lower leg: No edema.      Left lower leg: No edema.   Skin:     General: Skin is warm and dry.      Capillary Refill: Capillary refill takes less than 2 seconds.   Neurological:      General: No focal deficit present.      Mental Status: He is alert and oriented to person, place, and time.   Psychiatric:         Mood and Affect: Mood normal.         Behavior: Behavior normal.         "

## 2025-04-02 ENCOUNTER — TELEPHONE (OUTPATIENT)
Dept: UROLOGY | Facility: AMBULATORY SURGERY CENTER | Age: 60
End: 2025-04-02

## 2025-04-02 NOTE — TELEPHONE ENCOUNTER
Tried calling patient LVM in Chinese to remind him that he needs to obtain his PSA and BMP prior to his appt on Friday. Also stated in message that he is not to eat 8-12 hours prior to getting labs completed. Orders are in the chart if patient calls back please really message.

## 2025-04-04 ENCOUNTER — OFFICE VISIT (OUTPATIENT)
Dept: UROLOGY | Facility: AMBULATORY SURGERY CENTER | Age: 60
End: 2025-04-04
Payer: COMMERCIAL

## 2025-04-04 VITALS
SYSTOLIC BLOOD PRESSURE: 130 MMHG | HEART RATE: 60 BPM | BODY MASS INDEX: 31.98 KG/M2 | WEIGHT: 211 LBS | HEIGHT: 68 IN | OXYGEN SATURATION: 97 % | DIASTOLIC BLOOD PRESSURE: 90 MMHG

## 2025-04-04 DIAGNOSIS — C61 PROSTATE CANCER (HCC): Primary | ICD-10-CM

## 2025-04-04 LAB — PSA SERPL-MCNC: 5.74 NG/ML

## 2025-04-04 PROCEDURE — 99213 OFFICE O/P EST LOW 20 MIN: CPT | Performed by: UROLOGY

## 2025-04-04 NOTE — PROGRESS NOTES
"Name: Juan Galvez      : 1965      MRN: [id number]  Encounter Provider: Eloy Riggs MD  Encounter Date: 2025   Encounter department: [facility] [place]  :  Assessment & Plan  Prostate cancer (HCC)    Orders:  •  PSA Total, Diagnostic; Future      Impression: Small volume [place] 6 prostate cancer on active surveillance    Plan: I provided the patient his wife with reassurance that his PSA is low and stable at 5.74.  More importantly his multiparametric MRI of the prostate reveals PI-RADS 2 scoring with his 71 g gland.  In addition his PSA density is 0.08.  I recommend that he continue with active surveillance.  The patient and his wife are amenable with this plan.  Follow-up in 6 months with a repeat PSA.  We will continue to monitor PSA and multiparametric MRI every 12 to 18 months and only consider another biopsy if there is any significant change in above.    History of Present Illness   Juan Galvez is a 59 y.o. male who presents in follow-up for history of small volume [place] 6 prostate cancer on active surveillance.  He was initially diagnosed in 2019 in [facility].  He had a confirmatory biopsy performed at [facility] in .  His PSA has ranged between 5 and 7.  The biopsy performed in  at [facility] showed small volume [place] 6 disease.  He had Prolaris testing which showed 1% 10-year disease specific mortality with active surveillance.  I last saw him in the follow-up .  His PSA level at that time was noted to be 7.09.  His most recent PSA is down to 5.74.  In addition he returns to the office today in follow-up with a multiparametric MRI of the prostate which reveals a 71 g gland with PI-RADS 2 scoring.  PSA density is 0.08    Review of Systems       Objective   /90 (BP Location: Left arm, Patient Position: Sitting, Cuff Size: Adult)   Pulse 60   Ht 5' 8\" (1.727 m) Comment: verbal  Wt 95.7 kg (211 lb) " Comment: clothes and shoes  SpO2 97%   BMI 32.08 kg/m²     Physical Exam on examination he is in no acute distress.  Gait normal.  Affect normal    Results   Lab Results   Component Value Date    PSA 5.74 (H) 04/03/2025    PSA 7.09 (H) 09/04/2024    PSA 6.20 (H) 02/20/2024     Lab Results   Component Value Date    CALCIUM 9.2 03/05/2024    K 3.9 03/05/2024    CO2 28 03/05/2024     03/05/2024    BUN 20 03/05/2024    CREATININE 0.93 03/05/2024     Lab Results   Component Value Date    WBC 4.3 03/05/2024    HGB 14.7 03/05/2024    HCT 42.3 03/05/2024    MCV 88.5 03/05/2024     03/05/2024       Office Urine Dip  No results found for this or any previous visit (from the past hour).